# Patient Record
Sex: MALE | Race: WHITE
[De-identification: names, ages, dates, MRNs, and addresses within clinical notes are randomized per-mention and may not be internally consistent; named-entity substitution may affect disease eponyms.]

---

## 2021-05-14 ENCOUNTER — HOSPITAL ENCOUNTER (EMERGENCY)
Dept: HOSPITAL 95 - ER | Age: 31
Discharge: HOME | End: 2021-05-14
Payer: COMMERCIAL

## 2021-05-14 VITALS — HEIGHT: 69 IN | WEIGHT: 200 LBS | BODY MASS INDEX: 29.62 KG/M2

## 2021-05-14 DIAGNOSIS — Z88.1: ICD-10-CM

## 2021-05-14 DIAGNOSIS — Z23: ICD-10-CM
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
## 2021-05-14 DIAGNOSIS — S61.237A: Primary | ICD-10-CM

## 2021-05-14 PROCEDURE — A9270 NON-COVERED ITEM OR SERVICE: HCPCS

## 2022-01-22 ENCOUNTER — HOSPITAL ENCOUNTER (INPATIENT)
Dept: HOSPITAL 95 - ER | Age: 32
LOS: 2 days | Discharge: HOME | DRG: 494 | End: 2022-01-24
Attending: ORTHOPAEDIC SURGERY | Admitting: ORTHOPAEDIC SURGERY
Payer: COMMERCIAL

## 2022-01-22 VITALS — BODY MASS INDEX: 31.83 KG/M2 | HEIGHT: 68 IN | WEIGHT: 209.99 LBS

## 2022-01-22 DIAGNOSIS — S82.202A: Primary | ICD-10-CM
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
## 2022-01-22 DIAGNOSIS — Z88.1: ICD-10-CM

## 2022-01-22 DIAGNOSIS — F17.220: ICD-10-CM

## 2022-01-22 DIAGNOSIS — M25.462: ICD-10-CM

## 2022-01-22 DIAGNOSIS — Z20.822: ICD-10-CM
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
## 2022-01-22 DIAGNOSIS — S82.402A: ICD-10-CM

## 2022-01-22 LAB
ALBUMIN SERPL BCP-MCNC: 4.4 G/DL (ref 3.4–5)
ALBUMIN/GLOB SERPL: 1.2 {RATIO} (ref 0.8–1.8)
ALT SERPL W P-5'-P-CCNC: 51 U/L (ref 12–78)
ANION GAP SERPL CALCULATED.4IONS-SCNC: 8 MMOL/L (ref 6–16)
AST SERPL W P-5'-P-CCNC: 42 U/L (ref 12–37)
BASOPHILS # BLD AUTO: 0.04 K/MM3 (ref 0–0.23)
BASOPHILS NFR BLD AUTO: 0 % (ref 0–2)
BILIRUB SERPL-MCNC: 0.4 MG/DL (ref 0.1–1)
BUN SERPL-MCNC: 14 MG/DL (ref 8–24)
CALCIUM SERPL-MCNC: 8.9 MG/DL (ref 8.5–10.1)
CHLORIDE SERPL-SCNC: 110 MMOL/L (ref 98–108)
CK MB CFR SERPL CALC: 0.4 % (ref 0–4)
CK SERPL-CCNC: 956 U/L (ref 39–308)
CO2 SERPL-SCNC: 24 MMOL/L (ref 21–32)
CREAT SERPL-MCNC: 1.04 MG/DL (ref 0.6–1.2)
DEPRECATED RDW RBC AUTO: 38.1 FL (ref 35.1–46.3)
EOSINOPHIL # BLD AUTO: 0 K/MM3 (ref 0–0.68)
EOSINOPHIL NFR BLD AUTO: 0 % (ref 0–6)
ERYTHROCYTE [DISTWIDTH] IN BLOOD BY AUTOMATED COUNT: 12.4 % (ref 11.7–14.2)
GLOBULIN SER CALC-MCNC: 3.6 G/DL (ref 2.2–4)
GLUCOSE SERPL-MCNC: 94 MG/DL (ref 70–99)
HCT VFR BLD AUTO: 43.7 % (ref 37–53)
HGB BLD-MCNC: 15.7 G/DL (ref 13.5–17.5)
IMM GRANULOCYTES # BLD AUTO: 0.03 K/MM3 (ref 0–0.1)
IMM GRANULOCYTES NFR BLD AUTO: 0 % (ref 0–1)
LYMPHOCYTES # BLD AUTO: 1.89 K/MM3 (ref 0.84–5.2)
LYMPHOCYTES NFR BLD AUTO: 18 % (ref 21–46)
MCHC RBC AUTO-ENTMCNC: 35.9 G/DL (ref 31.5–36.5)
MCV RBC AUTO: 85 FL (ref 80–100)
MONOCYTES # BLD AUTO: 0.71 K/MM3 (ref 0.16–1.47)
MONOCYTES NFR BLD AUTO: 7 % (ref 4–13)
NEUTROPHILS # BLD AUTO: 7.79 K/MM3 (ref 1.96–9.15)
NEUTROPHILS NFR BLD AUTO: 74 % (ref 41–73)
NRBC # BLD AUTO: 0 K/MM3 (ref 0–0.02)
NRBC BLD AUTO-RTO: 0 /100 WBC (ref 0–0.2)
PLATELET # BLD AUTO: 236 K/MM3 (ref 150–400)
POTASSIUM SERPL-SCNC: 4.4 MMOL/L (ref 3.5–5.5)
PROT SERPL-MCNC: 8 G/DL (ref 6.4–8.2)
SODIUM SERPL-SCNC: 142 MMOL/L (ref 136–145)

## 2022-01-22 PROCEDURE — C1713 ANCHOR/SCREW BN/BN,TIS/BN: HCPCS

## 2022-01-22 PROCEDURE — A9270 NON-COVERED ITEM OR SERVICE: HCPCS

## 2022-01-22 PROCEDURE — 2W3MX1Z IMMOBILIZATION OF LEFT LOWER EXTREMITY USING SPLINT: ICD-10-PCS | Performed by: STUDENT IN AN ORGANIZED HEALTH CARE EDUCATION/TRAINING PROGRAM

## 2022-01-23 LAB
FLUAV RNA SPEC QL NAA+PROBE: NEGATIVE
FLUBV RNA SPEC QL NAA+PROBE: NEGATIVE
RSV RNA SPEC QL NAA+PROBE: NEGATIVE
SARS-COV-2 RNA RESP QL NAA+PROBE: NEGATIVE

## 2022-01-23 PROCEDURE — 0QSH04Z REPOSITION LEFT TIBIA WITH INTERNAL FIXATION DEVICE, OPEN APPROACH: ICD-10-PCS | Performed by: ORTHOPAEDIC SURGERY

## 2022-01-23 PROCEDURE — 0S9D0ZX DRAINAGE OF LEFT KNEE JOINT, OPEN APPROACH, DIAGNOSTIC: ICD-10-PCS | Performed by: ORTHOPAEDIC SURGERY

## 2022-01-23 NOTE — NUR
POST OP
PATIENT BROUGHT BACK TO ROOM POST OP ORIF LEFT TIB/FIB WITH PLATE AND SCREWS.
WAKES EASILY BUT REMAINS DROWSY. RATES PAIN 8/10. ABLE TO WIGGLE TOES, AND
REPORTS SENSATION IN TOES. WARM TO TOUCH WITH CAP REFILL LESS THAN 3 SECONDS.
TOLERATING SIPS OF WATER AND CRACKERS. MEDICATED FOR PAIN. IV FLUID RUNNING.

## 2022-01-23 NOTE — NUR
SHIFT SUMMARY
PATIENT ALERT AND ORIENTED WHEN AWAKE. WENT FOR ORIF OF LEFT TIB/FIB FX. PLATE
WITH SCREWS PLACED. ON RETURN FROM PACU LLE IN GAUZE AND ACE WRAP. ABLE TO
WIGGLE TOES AND REPORTS SENSATION TO FOOT AND KNEE. PAIN IS SEVERE.
ALTERNATING PO PAIN PILLS WITH IV DILAUDID. TOLERATING REGULAR DIET AND
LIQUIDS. O2 WEANED, SATS ABOVE 90% ON RA. RESTING IN BED AT THIS TIME.

## 2022-01-23 NOTE — NUR
SLEPT WELL THROUGH NIGHT. COMPLAINED OF VERY MILD DISCOMFORT TO LLE, REFUSED
ANY NEED FOR PAIN MEDICATON. NO ACUTE DISTRESS NOTED, RESPIRATIONS EVEN AND
UNLABORED. DRESSING CLEAN DRY AND INTACT TO LLE, ELEVATED ON PILLOWS. ABLE TO
WIGGLE TOES, CAP REFILL LESS THAN 2 SECONDS, DENIES NUMBESS/TINGLING. SAFETY
MAINTAINTED, CALL BELL IN REACH.

## 2022-01-24 NOTE — NUR
SHIFT SUMMARY
POD1 ORIF ON LLE. LLE WRAPPED WITH ACE WRAPPED ELEVATED. NOTED SOME SWELLING.
ICE IN PLACED. PT REPORTS CONSTANT PAIN 7-9/10. PAIN MANAGED WITH DILAUDID 1MG
AND 10MG PERCOCET. PT TOLERATING PO INTAKE, REPORTS MILD NAUSEA X1 AT THE
BEGINNING OF SHIFT BUT RESOLVED AFTER ZOFRAN IV WAS ADMINSTERED. DENIES
VOMITING. VSS. PT REPORT TOP L FOOT FEELS SOME MILD NUMBNESS BUT ABLE TO
WIGGLE TOES, MOVE ALL EXTREMITIES. CAP REFILL WNL. VOIDING AT THE SIDE OF BED
WITH URINAL. ABLE TO STAND AND USE FWW. PLAN: PT WILL WORK WITH THERAPY TODAY
AND POSSIBLE DISCHARGE. CALL LIGHT WITHIN REACH. WILL PROVIDE REPORT TO
ONCOMING NURSE.

## 2022-01-24 NOTE — NUR
DISCHARGE SUMMARY
PT WORKED WITH PHYSICAL THERAPY A SECOND TIME TODAY AND WAS ABLE TO DO STAIRS
AND MANAGE CRUTCHES AND HAS DECIDED HE WOULD LIKE TO GO HOME. PT REP WIFE
BOUGHT CRUTCHES AT [facility] AND IS WAITING TO PICK HIM UP. DC INSTRUCTIONS
PROVIDED. PT REP UNDERSTANDING THOSE INSTRUCTIONS, DRESSING CHANGES, NWB, ALL
AMBULATION WITH CRUTCHES, FU APPT. PT LEFT FLOOR VIA WC WITH SN, WITH DC
PACKET, AQUACEL DRESSINGS; PT REP NARC SCRIPT ALREADY FILLED AND AT HOME. IV
DC'D.

## 2022-01-24 NOTE — NUR
SHIFT SUMMARY
PT A&OX4, VSS/RA. POD1 L ORIF/AQUACEL, NWB, ELEVATED ON PILLOWS. AMBULATE
WITH FWW/GB TO BRP, UP TO CHAIR. PAIN MANAGED WITH 10 MG PERC. VOIDING WELL.
CHARLINE PO. WILL REPORT TO ONCOMING NOC RN.